# Patient Record
(demographics unavailable — no encounter records)

---

## 2025-01-16 NOTE — PLAN
[FreeTextEntry1] :  14 year old G0 female presents to discuss hirsutism.   -Given hyperandrogen sxs and oligomenorrhea pt meets criteria for PCOS, will r/o other causes with bloodwork today -Offered OCP, pt will consider  F/u pending bloodwork results.

## 2025-01-16 NOTE — HISTORY OF PRESENT ILLNESS
[FreeTextEntry1] :  14 year old G0 female presents to discuss hirsutism. Had menarche at age 11. Reports menses every 60 days, lasting 3-5 days. C/o dark hair growth on face - has had multiple laser treatments. Also has acne - treated with Doxycycline. States does not track periods, but believes they come every other month. Saw pediatric urology in past for labial hypertrophy, which was noted by pediatrician. Pt denies any sxs.   PMHx: h/o labial hypertrophy  MedHx: doxycycline Allergies: NKDA

## 2025-01-16 NOTE — END OF VISIT
[FreeTextEntry3] :  I, Ana Love, acted as a scribe on behalf of Dr. Katrina Schafer M.D. on 01/16/2025.   All medical entries made by the scribe were at my, Dr. Katrina Schafer M.D., direction and personally dictated by me on 01/16/2025. I have reviewed the chart and agree that the record accurately reflects my personal performance of the history, physical exam, assessment and plan. I have also personally directed, reviewed, and agreed with the chart.